# Patient Record
Sex: FEMALE | Race: WHITE | NOT HISPANIC OR LATINO | Employment: OTHER | URBAN - METROPOLITAN AREA
[De-identification: names, ages, dates, MRNs, and addresses within clinical notes are randomized per-mention and may not be internally consistent; named-entity substitution may affect disease eponyms.]

---

## 2017-01-05 ENCOUNTER — OFFICE VISIT (OUTPATIENT)
Dept: PHYSICAL MEDICINE AND REHAB | Facility: CLINIC | Age: 67
End: 2017-01-05
Payer: MEDICARE

## 2017-01-05 DIAGNOSIS — M51.36 DDD (DEGENERATIVE DISC DISEASE), LUMBAR: ICD-10-CM

## 2017-01-05 PROCEDURE — 99499 UNLISTED E&M SERVICE: CPT | Mod: S$GLB,,, | Performed by: PHYSICAL MEDICINE & REHABILITATION

## 2017-01-05 PROCEDURE — 95909 NRV CNDJ TST 5-6 STUDIES: CPT | Mod: 26,,, | Performed by: PHYSICAL MEDICINE & REHABILITATION

## 2017-01-05 PROCEDURE — 95886 MUSC TEST DONE W/N TEST COMP: CPT | Mod: 26,,, | Performed by: PHYSICAL MEDICINE & REHABILITATION

## 2017-01-05 NOTE — LETTER
January 8, 2017      Kapil Earl MD  [address] [facility] [address] [place] [address]           [facility]  [address] [facility] [address] [place] [address]  Phone: [phone]  Fax: [phone]          Patient: Meena Garcia   MR Number: [id number]   YOB: 1950   Date of Visit: 1/5/2017       Dear Dr. Kapil Earl:    Thank you for referring Meena Garcia to me for evaluation. Attached you will find relevant portions of my assessment and plan of care.    If you have questions, please do not hesitate to call me. I look forward to following Meena Garcia along with you.    Sincerely,    Patric Kruger MD    Enclosure  CC:  No Recipients    If you would like to receive this communication electronically, please contact [email or web address] or [phone] to request more information on [employer] access.    For providers and/or their staff who would like to refer a patient to [facility], please contact us through our one-stop-shop provider referral line, [facility], at [phone].    If you feel you have received this communication in error or would no longer like to receive these types of communications, please e-mail [email or web address]

## 2017-01-08 NOTE — PROGRESS NOTES
[facility]  [address]  [place] [address]             Full Name: SIM FERRER Gender: Female  Patient ID: [id number] YOB: 1950  History: Pt reports intermittent bilateral leg pains. She also reports some occasional paresthesias in the legs. She has chronic low back pain.      Visit Date: 1/5/2017 08:07  Age: 66 Years 4 Months Old  Examining Physician: GUEVARA  Referring Physician: LUIS      Sensory NCS      Nerve / Sites Rec. Site Onset Lat Peak Lat NP Amp PP Amp Segments Distance Velocity     ms ms µV µV  cm m/s   L Sural - Ankle (Calf)      Calf Ankle 3.23 3.96 14.0 11.8 Calf - Ankle 14 43      2 Ankle 3.44 4.01 12.3 0.46      R Sural - Ankle (Calf)      Calf Ankle 2.97 3.59 8.7 1.2 Calf - Ankle 14 47      2 Ankle 3.28 3.91 15.2 2.4      R Superficial peroneal - Ankle      Lat leg Ankle 2.86 3.75 5.3 3.8 Lat leg - Ankle 14 49       Motor NCS      Nerve / Sites Muscle Latency Amplitude Amp % Duration Segments Distance Lat Diff Velocity     ms mV % ms  cm ms m/s   L Peroneal - EDB      Ankle EDB 3.96 6.0 100 5.36 Ankle - EDB 8        Fib head EDB 10.36 5.3 89.1 5.78 Fib head - Ankle 31 6.41 48   R Peroneal - EDB      Ankle EDB 4.11 2.6 100 5.05 Ankle - EDB 8        Fib head EDB 10.83 2.4 90.5 5.36 Fib head - Ankle 30 6.72 45   R Tibial - AH      Ankle AH 4.01 10.7 100 5.05 Ankle - AH 8        Pop fossa AH 12.86 8.4 78.9 5.26 Pop fossa - Ankle 40 8.85 45       EMG         EMG Summary Table     Spontaneous MUAP Recruitment   Muscle IA Fib PSW Fasc H.F. Amp Dur. PPP Pattern   R. Vastus medialis N None None None None N N N N   R. Tibialis anterior N None None None None N N N N   R. Peroneus longus N None None None None N N N N   R. Gastrocnemius (Medial head) N None None None None N N N N   R. First dorsal interosseous N None None None None N N N N   R. Gluteus medius N None None None None N N N N   R. Gluteus aida N None None None None N N N N   R. Lumbar paraspinals N  None None None None N N N N   L. Lumbar paraspinals N None None None None N N N N       Summary    The motor conduction test was normal in all 3 of the tested nerves: L Peroneal - EDB, R Peroneal - EDB, R Tibial - AH.    The sensory conduction test was normal in all 3 of the tested nerves: L Sural - Ankle (Calf), R Sural - Ankle (Calf), R Superficial peroneal - Ankle.    The needle EMG study was normal in all 9 tested muscles: R. Vastus medialis, R. Tibialis anterior, R. Peroneus longus, R. Gastrocnemius (Medial head), R. First dorsal interosseous, R. Gluteus medius, R. Gluteus aida, R. Lumbar paraspinals, L. Lumbar paraspinals.      Electrodiagnostic Impression:  1. Normal electrodiagnostic study.  2. There was no electrophysiologic evidence of peripheral polyneuropathy, focal mononeuropathy, myopathy, or lumbosacral radiculopathy/plexopathy of the right lower extremity.    Recommendations:  Todays electrodiagnostic test results will be sent to her referring physician, Dr. Earl, who may then direct her further course of evaluation and treatment.    Thank you very much for the referral. Please call if you have any questions regarding this study or the report.       -------------------------------  Patric Kruger M.D.